# Patient Record
(demographics unavailable — no encounter records)

---

## 2024-11-25 NOTE — REASON FOR VISIT
23 07 - 23 0659   Shift [id number] 24 Hour Total [id number] CLINICAL HISTORY: Follow-up abscess. TECHNIQUE: Ultrasound examination of the right groin was performed.  COMPARISON:08/11/2023 FINDINGS:  There is complex subcutaneous collection consistent with abscess and measures 1.1 x 0.6 x 0.4 cm.  This measures smaller than on the previous examination.  There is adjacent hyperemia and subcutaneous edema.  There are prominent inguinal lymph nodes within the right lower quadrant. No other significant findings are seen.     Right inguinal collection consistent abscess measures smaller than on the previous examination.  Correlation with any history of surgical or self drainage is recommended. Electronically Signed by: ALLA CACERES MD Signed on: 8/12/2023 10:45 AM Workstation ID: [id number]       Assessment & Plan  Maureen Guerin is a 5 year old female with no PMH who presents with a fever and rash for past 2 days.  Additionally, the patient was found to have a small abscess with overlying cellulitis in the groin.  Patient is afebrile, hemodynamically stable, without leukocytosis.  Labs are significant for Hgb 8, proBNP 3856.    - Continue IV ABX  - Right groin abscess spontaneously draining  - Will defer surgical I&D for time being  - Rest per primary team      Will discuss plan with attending.    CONNIE Boyle MD  Pediatric Surgery, PGY2        Attending Surgeon Attestation  I have personally seen and examined this patient on 08/12/2023.  I agree with the assessment and plan as stated above.  - Warm packs to groin  - Will assess for operative drainage if fluctuance recurs      Luis Villa MD, MA     [Follow-Up] : a follow-up visit

## 2024-11-25 NOTE — HISTORY OF PRESENT ILLNESS
[FreeTextEntry1] : f/u SLE 5, proteinuria off MMF since last visit no complaints /80 or less when checks labs done and reviewed - see below meds reviewed with pt and list updated PCP Dr Hayward

## 2024-11-25 NOTE — PHYSICAL EXAM
[General Appearance - Alert] : alert [General Appearance - In No Acute Distress] : in no acute distress [Sclera] : the sclera and conjunctiva were normal [Jugular Venous Distention Increased] : there was no jugular-venous distention [Auscultation Breath Sounds / Voice Sounds] : lungs were clear to auscultation bilaterally [Heart Rate And Rhythm] : heart rate was normal and rhythm regular [Heart Sounds] : normal S1 and S2 [Edema] : there was no peripheral edema [Abdomen Soft] : soft [No CVA Tenderness] : no ~M costovertebral angle tenderness [Abdomen Tenderness] : non-tender [Involuntary Movements] : no involuntary movements were seen [] : no rash [No Focal Deficits] : no focal deficits [Oriented To Time, Place, And Person] : oriented to person, place, and time [Affect] : the affect was normal

## 2024-11-25 NOTE — ASSESSMENT
[FreeTextEntry1] : SLE 5 -- in remission with minimal microalbuminuria only. off IS  HTN controlled obesity cont ARB and aldactone cont to emphasize weight loss -- consider GLP- 1 agonist though quite reluctant to add med f/u 5-6 mos

## 2025-01-02 NOTE — HISTORY OF PRESENT ILLNESS
[FreeTextEntry1] : F/u [de-identified] : 53 yo female PMH prediabetes, HTN, hypothyroidism, connective tissue disease on hydroxychloroquine, anemia presents for follow up. Patient last seen in Sept 2024. Patient has upcoming stress test with cardiology Dr. Bernal later this month. Has upcoming appt with rheum Dr. Torrse this month as well. Patient follows with nephro Dr. Vigil, states she was recently taken off spironolactone however DBP has been creeping up into the 90s intermittently.  Feeling well today.

## 2025-01-13 NOTE — DATA REVIEWED
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  There are also foci of there are tubular basement membrane deposits.  Endocapillary hypercellularity and cellular crescents are not seen.  No global glomerular sclerosis.  Tubular atrophy and interstitial fibrosis 5% of cortex.  No significant arterial and arteriolar sclerosis. The membranous pattern is rather nonspecific and can be observed in several groups of diseases.  Process is often primary and in 70 to 80% of cases related to the presence of autoantibodies directed against an M2 phospholipase.  Secondary forms can be seen in lupus rheumatoid arthritis mixed and undifferentiated connective tissue disease order, Sjogren's, and complement deficiencies.  Sometimes the nephrotic syndrome antedates the initiation of full-fledged systemic disease.

## 2025-01-13 NOTE — HISTORY OF PRESENT ILLNESS
[FreeTextEntry1] : 52 year old woman returns for follow up   April 2, 2021 Last visit more than three years ago Patient was previously seen by me at [place]. Will try to obtain previous records.  Patent with connective tissue disease, treated with methotrexate and plaquenil but not in the last three to four years.  Patient returns at this time, as feels an increase in muscle cramping.  Feels cramping in the hands which makes the fingers move and stiffen for minutes at a time, feels happening more frequently than previously. Also feels muscle spasm in the neck and chin area, feels better with tapping the area to relieve the spasm.  Also starting to get cramping in the feet, toes stick together when occurs and can be painful.  patient was seen by [facility] at [facility] No medications at present, blood pressure elevated, evaluated by PMD, trial of diet and exercise and has follow up next month  Last visit with rheumatologist in [facility]  Took methotrexate and plaquenil in past, not for the past couple of years Was seen by PMD yesterday, had Covid vaccine in the left arm, feels some pain at site of vaccine, no other side effects noted  No rashes, no oral ulcers, no alopecia No Raynaud's No swallowing issues No GERD Will start exercising, +weight gain  No known history of Covid  May 6, 2021 Started on anti HTN medications, blood pressure better controlled Benazepril 10 mg qday amlodipine 2.5 mg qday Had repeat thyroid testing, no medications prescribed, will see doctor at end of month Started plaquenil 200 mg bid Has been taking bid dosing since last week Feels some improvement in terms of hand edema, no side effects noted Feels some edema over the ankle bilaterally, left more than right  June 7, 2021 Blood pressure better since taking medications Now with some lower extremity edema, left more than right. not painful, no history of trauma, no pain with activity or weight bearing, no twisting injury noted  No joint pains, no muscle twitching or cramps No morning stiffness no new rashes Taking levothyroxine  September 9, 2021 Patient returns for follow up Since last visit, was seen by urologist, repeat UA without hematuria Some proteinuria noted, will see PMD today Was seen by cardiologist, was seen and referred for stress test, November 2021 No new medications Taking vitamin D and B12 Feeling well No joint pains Otherwise feeling well Not exercising No chest pain or shortness of breath, tried to change diet, trying to eat more healthy  November 11, 2021 Patient returns for follow up  Reviewed labs with patient Reviewed biopsy results Proteinuria improved with addition of losartan, from 3.3 gm to 1.5 Discussed additional immunosuppressive cellcept vs rituximab Patient feeling well  January 5, 2022 Patient feeling well Reviewed labs with patient tolerating medications well Proteinuria fluctuating, most recent 1.8 Had some weight gain following the holidays, will restart exercise and dietary modifications  March 22, 2022 Patient returns for follow up, feeling well Blood pressure well controlled today Started on cellcept by nephrologist last month, now taking 1 gm bid, some loose bowel movements, but otherwise tolerating well Will see Dr. Vigil in June 2022 Labs reviewed  Discussed ophthalmologist follow up as well given plaquenil use Trying to exercise more No recent infections noted  June 21, 2022 Patient returns for follow up  Feeling well Taking cellcept 1 gm bid, some loose bowel movements noted but manageable Plaquenil 200 mg bid Reviewed recent labs, hgb decreased from previous levels Recently evaluated by cardiologist and nephrologist Will make appointment for ophthalmologist  October 18, 2022 Patient feeling well, no concerns at this time. Tolerating medications, continue CellCept 1 g twice daily as well as Plaquenil 200 mg twice daily. Had recent follow-up with nephrology, continue current management. Reviewed labs with patient, reviewed urine studies with patient. Patient has not made appointment for ophthalmologist to date we will send referral today Trying to exercise more, dietary modifications. 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  Denies increase in lower extremity edema while holding spironolactone Taking hydroxychloroquine 200 mg bid, will schedule appointment with ophthalmology  Exercising twice per week, adhering to walking, discussed weight loss as well  Reports left shoulder pain for the past two days, thinks may be related to sleeping position  Reports Raynaud's symptoms yesterday, however, have not recently occurred prior  Intermittent SOB  Will follow up with cardiology on 1/22, will undergo stress test at that time

## 2025-01-13 NOTE — ASSESSMENT
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  Patient following closely with nephrologist, today blood pressure elevated, following with nephrology re: proteinuria, most recent protein to creatinine ratio 0.1 in November 2024. Patient currently holding spironolactone medication, will reevaluate continuation of spironolactone and protein/creatinine with nephrologist in 3 months, although will continue to monitor blood pressure at home, if remains elevated patient will contact her nephrologist regarding restarting.  Continues losartan 100mg daily.  Continues cellcept in February 2022, now taking cellcept 1 gm bid. Recent labs reviewed with patient.  Patient was seen by primary care doctor on 1/02, A1c repeated, continues diet modification and increasing exercise. Patient with intermittent SOB, will follow up with cardiology on 1/22 and will undergo stress test at that time.  Patient continues plaquenil 200 mg bid without side effects and improvement in arthralgias, will schedule follow-up with ophthalmologist.   Follow-up in 6 months or sooner as needed.

## 2025-01-13 NOTE — PHYSICAL EXAM
[General Appearance - Alert] : alert [General Appearance - In No Acute Distress] : in no acute distress [Outer Ear] : the ears and nose were normal in appearance [Oropharynx] : the oropharynx was normal [Auscultation Breath Sounds / Voice Sounds] : lungs were clear to auscultation bilaterally [Heart Rate And Rhythm] : heart rate was normal and rhythm regular [Heart Sounds] : normal S1 and S2 [Heart Sounds Gallop] : no gallops [Murmurs] : no murmurs [Heart Sounds Pericardial Friction Rub] : no pericardial rub [Full Pulse] : the pedal pulses are present [Edema] : there was no peripheral edema [Abnormal Walk] : normal gait [Nail Clubbing] : no clubbing  or cyanosis of the fingernails [Musculoskeletal - Swelling] : no joint swelling seen [Motor Tone] : muscle strength and tone were normal [Oriented To Time, Place, And Person] : oriented to person, place, and time [Impaired Insight] : insight and judgment were intact [Affect] : the affect was normal [Respiration, Rhythm And Depth] : normal respiratory rhythm and effort [Exaggerated Use Of Accessory Muscles For Inspiration] : no accessory muscle use [No Spinal Tenderness] : no spinal tenderness [] : no rash [Skin Lesions] : no skin lesions [Mood] : the mood was normal

## 2025-01-13 NOTE — HISTORY OF PRESENT ILLNESS
[FreeTextEntry1] : 52 year old woman returns for follow up   April 2, 2021 Last visit more than three years ago Patient was previously seen by me at [place]. Will try to obtain previous records.  Patent with connective tissue disease, treated with methotrexate and plaquenil but not in the last three to four years.  Patient returns at this time, as feels an increase in muscle cramping.  Feels cramping in the hands which makes the fingers move and stiffen for minutes at a time, feels happening more frequently than previously. Also feels muscle spasm in the neck and chin area, feels better with tapping the area to relieve the spasm.  Also starting to get cramping in the feet, toes stick together when occurs and can be painful.  patient was seen by New PMD at [facility] No medications at present, blood pressure elevated, evaluated by PMD, trial of diet and exercise and has follow up next month  Last visit with rheumatologist in [facility]  Took methotrexate and plaquenil in past, not for the past couple of years Was seen by PMD yesterday, had Covid vaccine in the left arm, feels some pain at site of vaccine, no other side effects noted  No rashes, no oral ulcers, no alopecia No Raynaud's No swallowing issues No GERD Will start exercising, +weight gain  No known history of Covid  May 6, 2021 Started on anti HTN medications, blood pressure better controlled Benazepril 10 mg qday amlodipine 2.5 mg qday Had repeat thyroid testing, no medications prescribed, will see doctor at end of month Started plaquenil 200 mg bid Has been taking bid dosing since last week Feels some improvement in terms of hand edema, no side effects noted Feels some edema over the ankle bilaterally, left more than right  June 7, 2021 Blood pressure better since taking medications Now with some lower extremity edema, left more than right. not painful, no history of trauma, no pain with activity or weight bearing, no twisting injury noted  No joint pains, no muscle twitching or cramps No morning stiffness no new rashes Taking levothyroxine  September 9, 2021 Patient returns for follow up Since last visit, was seen by urologist, repeat UA without hematuria Some proteinuria noted, will see PMD today Was seen by cardiologist, was seen and referred for stress test, November 2021 No new medications Taking vitamin D and B12 Feeling well No joint pains Otherwise feeling well Not exercising No chest pain or shortness of breath, tried to change diet, trying to eat more healthy  November 11, 2021 Patient returns for follow up  Reviewed labs with patient Reviewed biopsy results Proteinuria improved with addition of losartan, from 3.3 gm to 1.5 Discussed additional immunosuppressive cellcept vs rituximab Patient feeling well  January 5, 2022 Patient feeling well Reviewed labs with patient tolerating medications well Proteinuria fluctuating, most recent 1.8 Had some weight gain following the holidays, will restart exercise and dietary modifications  March 22, 2022 Patient returns for follow up, feeling well Blood pressure well controlled today Started on cellcept by nephrologist last month, now taking 1 gm bid, some loose bowel movements, but otherwise tolerating well Will see Dr. Vigil in June 2022 Labs reviewed  Discussed ophthalmologist follow up as well given plaquenil use Trying to exercise more No recent infections noted  June 21, 2022 Patient returns for follow up  Feeling well Taking cellcept 1 gm bid, some loose bowel movements noted but manageable Plaquenil 200 mg bid Reviewed recent labs, hgb decreased from previous levels Recently evaluated by cardiologist and nephrologist Will make appointment for ophthalmologist  October 18, 2022 Patient feeling well, no concerns at this time. Tolerating medications, continue CellCept 1 g twice daily as well as Plaquenil 200 mg twice daily. Had recent follow-up with nephrology, continue current management. Reviewed labs with patient, reviewed urine studies with patient. Patient has not made appointment for ophthalmologist to date we will send referral today Trying to exercise more, dietary modifications. 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  Denies increase in lower extremity edema while holding spironolactone Taking hydroxychloroquine 200 mg bid, will schedule appointment with ophthalmology  Exercising twice per week, adhering to walking, discussed weight loss as well  Reports left shoulder pain for the past two days, thinks may be related to sleeping position  Reports Raynaud's symptoms yesterday, however, have not recently occurred prior  Intermittent SOB  Will follow up with cardiology on 1/22, will undergo stress test at that time

## 2025-01-13 NOTE — ADDENDUM
[FreeTextEntry1] :  I, Bee Amador, acted solely as a scribe for Dr. Mili Torres, direction and personally dictated by me on 01/08/2025. I have reviewed the chart and agree that the record accurately reflects my personal performance of the history, physical exam, assessment, and plan. I have also personally directed, reviewed, and agreed with the chart.

## 2025-01-13 NOTE — HISTORY OF PRESENT ILLNESS
[FreeTextEntry1] : 52 year old woman returns for follow up   April 2, 2021 Last visit more than three years ago Patient was previously seen by me at [facility]. Will try to obtain previous records.  Patent with connective tissue disease, treated with methotrexate and plaquenil but not in the last three to four years.  Patient returns at this time, as feels an increase in muscle cramping.  Feels cramping in the hands which makes the fingers move and stiffen for minutes at a time, feels happening more frequently than previously. Also feels muscle spasm in the neck and chin area, feels better with tapping the area to relieve the spasm.  Also starting to get cramping in the feet, toes stick together when occurs and can be painful.  patient was seen by [facility] at [facility] No medications at present, blood pressure elevated, evaluated by PMD, trial of diet and exercise and has follow up next month  Last visit with rheumatologist in [facility]  Took methotrexate and plaquenil in past, not for the past couple of years Was seen by PMD yesterday, had Covid vaccine in the left arm, feels some pain at site of vaccine, no other side effects noted  No rashes, no oral ulcers, no alopecia No Raynaud's No swallowing issues No GERD Will start exercising, +weight gain  No known history of Covid  May 6, 2021 Started on anti HTN medications, blood pressure better controlled Benazepril 10 mg qday amlodipine 2.5 mg qday Had repeat thyroid testing, no medications prescribed, will see doctor at end of month Started plaquenil 200 mg bid Has been taking bid dosing since last week Feels some improvement in terms of hand edema, no side effects noted Feels some edema over the ankle bilaterally, left more than right  June 7, 2021 Blood pressure better since taking medications Now with some lower extremity edema, left more than right. not painful, no history of trauma, no pain with activity or weight bearing, no twisting injury noted  No joint pains, no muscle twitching or cramps No morning stiffness no new rashes Taking levothyroxine  September 9, 2021 Patient returns for follow up Since last visit, was seen by urologist, repeat UA without hematuria Some proteinuria noted, will see PMD today Was seen by cardiologist, was seen and referred for stress test, November 2021 No new medications Taking vitamin D and B12 Feeling well No joint pains Otherwise feeling well Not exercising No chest pain or shortness of breath, tried to change diet, trying to eat more healthy  November 11, 2021 Patient returns for follow up  Reviewed labs with patient Reviewed biopsy results Proteinuria improved with addition of losartan, from 3.3 gm to 1.5 Discussed additional immunosuppressive cellcept vs rituximab Patient feeling well  January 5, 2022 Patient feeling well Reviewed labs with patient tolerating medications well Proteinuria fluctuating, most recent 1.8 Had some weight gain following the holidays, will restart exercise and dietary modifications  March 22, 2022 Patient returns for follow up, feeling well Blood pressure well controlled today Started on cellcept by nephrologist last month, now taking 1 gm bid, some loose bowel movements, but otherwise tolerating well Will see Dr. Vigil in June 2022 Labs reviewed  Discussed ophthalmologist follow up as well given plaquenil use Trying to exercise more No recent infections noted  June 21, 2022 Patient returns for follow up  Feeling well Taking cellcept 1 gm bid, some loose bowel movements noted but manageable Plaquenil 200 mg bid Reviewed recent labs, hgb decreased from previous levels Recently evaluated by cardiologist and nephrologist Will make appointment for ophthalmologist  October 18, 2022 Patient feeling well, no concerns at this time. Tolerating medications, continue CellCept 1 g twice daily as well as Plaquenil 200 mg twice daily. Had recent follow-up with nephrology, continue current management. Reviewed labs with patient, reviewed urine studies with patient. Patient has not made appointment for ophthalmologist to date we will send referral today Trying to exercise more, dietary modifications. 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  Denies increase in lower extremity edema while holding spironolactone Taking hydroxychloroquine 200 mg bid, will schedule appointment with ophthalmology  Exercising twice per week, adhering to walking, discussed weight loss as well  Reports left shoulder pain for the past two days, thinks may be related to sleeping position  Reports Raynaud's symptoms yesterday, however, have not recently occurred prior  Intermittent SOB  Will follow up with cardiology on 1/22, will undergo stress test at that time

## 2025-01-23 NOTE — REASON FOR VISIT
[Symptom and Test Evaluation] : symptom and test evaluation [FreeTextEntry1] : 52F comes in for a follow up visit. Carotid and PVD testing completed. We are discussing results.  Patient completed an unremarkable CCTA about a year ago.

## 2025-01-23 NOTE — DISCUSSION/SUMMARY
[FreeTextEntry1] : SOB CCTA reviewed. check echocardiogram if able to approve and schedule. HTN cont losartan, she wants to touch base with Dr Vigil regarding other medications PVD testing reviewed. no additional testing warranted Carotid us reviewed. no significant disease noted.

## 2025-05-27 NOTE — HISTORY OF PRESENT ILLNESS
[FreeTextEntry1] : f/u SLE 5  no complaints except some tingling left arm about a month BP about 138 systolic at home last check - higher at doctor visits in January meds reviewed with pt  labs done and reviewed - see below PCP Dr Hayward

## 2025-05-27 NOTE — PHYSICAL EXAM
[General Appearance - Alert] : alert [General Appearance - In No Acute Distress] : in no acute distress [Sclera] : the sclera and conjunctiva were normal [Jugular Venous Distention Increased] : there was no jugular-venous distention [Auscultation Breath Sounds / Voice Sounds] : lungs were clear to auscultation bilaterally [Heart Rate And Rhythm] : heart rate was normal and rhythm regular [Heart Sounds] : normal S1 and S2 [Edema] : there was no peripheral edema [Abdomen Soft] : soft [Abdomen Tenderness] : non-tender [Abnormal Walk] : normal gait [Involuntary Movements] : no involuntary movements were seen [] : no rash [No Focal Deficits] : no focal deficits [Oriented To Time, Place, And Person] : oriented to person, place, and time [Affect] : the affect was normal

## 2025-05-27 NOTE — ASSESSMENT
[FreeTextEntry1] : SLE 5 in remission -- off IS except HCQ minimal albuminuria on ARB and MRA BP appears controlled here - though less optimal at other doctor visits-- cont to follow cont to emphasize weight loss for obesity -- consider GLP-1 or bariatric eval -- wants to d/w all her physicians f/u 5 mos  f/u rheum